# Patient Record
Sex: FEMALE | URBAN - METROPOLITAN AREA
[De-identification: names, ages, dates, MRNs, and addresses within clinical notes are randomized per-mention and may not be internally consistent; named-entity substitution may affect disease eponyms.]

---

## 2020-09-18 ENCOUNTER — OFFICE VISIT (OUTPATIENT)
Dept: URBAN - METROPOLITAN AREA TELEHEALTH 2 | Facility: TELEHEALTH | Age: 50
End: 2020-09-18
Payer: COMMERCIAL

## 2020-09-18 DIAGNOSIS — E66.9 OBESITY (BMI 30.0-34.9): ICD-10-CM

## 2020-09-18 DIAGNOSIS — K21.9 GERD: ICD-10-CM

## 2020-09-18 DIAGNOSIS — D50.8 OTHER IRON DEFICIENCY ANEMIAS: ICD-10-CM

## 2020-09-18 DIAGNOSIS — K50.80 CROHN'S DISEASE OF BOTH SMALL AND LARGE INTESTINE: ICD-10-CM

## 2020-09-18 DIAGNOSIS — K90.89 BILE SALT-INDUCED DIARRHEA: ICD-10-CM

## 2020-09-18 PROCEDURE — 99214 OFFICE O/P EST MOD 30 MIN: CPT | Performed by: INTERNAL MEDICINE

## 2020-09-18 PROCEDURE — 86480 TB TEST CELL IMMUN MEASURE: CPT | Performed by: INTERNAL MEDICINE

## 2020-09-18 PROCEDURE — G9903 PT SCRN TBCO ID AS NON USER: HCPCS | Performed by: INTERNAL MEDICINE

## 2020-09-18 PROCEDURE — G8427 DOCREV CUR MEDS BY ELIG CLIN: HCPCS | Performed by: INTERNAL MEDICINE

## 2020-09-18 PROCEDURE — 1036F TOBACCO NON-USER: CPT | Performed by: INTERNAL MEDICINE

## 2020-09-18 PROCEDURE — 3017F COLORECTAL CA SCREEN DOC REV: CPT | Performed by: INTERNAL MEDICINE

## 2020-09-18 PROCEDURE — G8417 CALC BMI ABV UP PARAM F/U: HCPCS | Performed by: INTERNAL MEDICINE

## 2020-09-18 RX ORDER — AMLODIPINE BESYLATE 10 MG/1
TAKE 1 TABLET (10 MG) BY ORAL ROUTE ONCE DAILY TABLET ORAL 1
Qty: 0 | Refills: 0 | Status: ACTIVE | COMMUNITY
Start: 1900-01-01

## 2020-09-18 RX ORDER — MERCAPTOPURINE 50 MG/1
1 TAB TABLET ORAL DAILY
Qty: 30 TABLET | Refills: 2 | OUTPATIENT
Start: 2020-09-18

## 2020-09-20 ENCOUNTER — TELEPHONE ENCOUNTER (OUTPATIENT)
Dept: URBAN - METROPOLITAN AREA CLINIC 92 | Facility: CLINIC | Age: 50
End: 2020-09-20

## 2020-09-20 PROBLEM — 56689002: Status: ACTIVE | Noted: 2020-09-20

## 2020-09-20 RX ORDER — AMLODIPINE BESYLATE 10 MG/1
TAKE 1 TABLET (10 MG) BY ORAL ROUTE ONCE DAILY TABLET ORAL 1
Qty: 0 | Refills: 0 | Status: ACTIVE | COMMUNITY
Start: 1900-01-01

## 2020-09-20 RX ORDER — DIPHENHYDRAMINE HYDROCHLORIDE 50 MG/ML
0.5 TO 1 ML INJECTION INTRAMUSCULAR; INTRAVENOUS
Qty: 1 VIAL | Refills: 11 | OUTPATIENT
Start: 2020-09-20

## 2020-09-20 RX ORDER — INFLIXIMAB 100 MG/10ML
5 MG/KG INJECTION, POWDER, LYOPHILIZED, FOR SOLUTION INTRAVENOUS
Qty: 5 VIALS | Refills: 11 | OUTPATIENT
Start: 2020-09-20 | End: 2020-10-02

## 2020-09-20 RX ORDER — MERCAPTOPURINE 50 MG/1
1 TAB TABLET ORAL DAILY
Qty: 30 TABLET | Refills: 2 | Status: ACTIVE | COMMUNITY
Start: 2020-09-18

## 2020-09-20 RX ORDER — HYDROCORTISONE SODIUM SUCCINATE 100 MG/2ML
100 TO 200 MG INJECTION, POWDER, FOR SOLUTION INTRAMUSCULAR; INTRAVENOUS
Qty: 2 VIALS | Refills: 11 | OUTPATIENT
Start: 2020-09-20 | End: 2020-10-02

## 2020-09-22 LAB
A/G RATIO: 1.9
ALBUMIN: 4.8
ALKALINE PHOSPHATASE: 102
ALT (SGPT): 25
AST (SGOT): 20
BASO (ABSOLUTE): 0
BASOS: 0
BILIRUBIN, TOTAL: 0.4
BUN/CREATININE RATIO: 6
BUN: 5
C-REACTIVE PROTEIN, QUANT: 2
CALCIUM: 9.9
CARBON DIOXIDE, TOTAL: 22
CHLORIDE: 102
CREATININE: 0.78
EGFR IF AFRICN AM: 103
EGFR IF NONAFRICN AM: 89
EOS (ABSOLUTE): 0.2
EOS: 2
GLOBULIN, TOTAL: 2.5
GLUCOSE: 181
HBSAG SCREEN: NEGATIVE
HEMATOCRIT: 39.9
HEMATOLOGY COMMENTS:: (no result)
HEMOGLOBIN: 12.1
HEP B CORE AB, IGM: NEGATIVE
HEP B CORE AB, TOT: NEGATIVE
HEPATITIS B SURF AB QUANT: <3.1
IMMATURE CELLS: (no result)
IMMATURE GRANS (ABS): 0
IMMATURE GRANULOCYTES: 0
LYMPHS (ABSOLUTE): 1.9
LYMPHS: 28
MCH: 24.1
MCHC: 30.3
MCV: 80
MONOCYTES(ABSOLUTE): 0.2
MONOCYTES: 4
NEUTROPHILS (ABSOLUTE): 4.5
NEUTROPHILS: 66
NRBC: (no result)
PLATELETS: 358
POTASSIUM: 3.5
PROTEIN, TOTAL: 7.3
QUANTIFERON CRITERIA: (no result)
QUANTIFERON INCUBATION: (no result)
QUANTIFERON MITOGEN VALUE: 4.11
QUANTIFERON NIL VALUE: 0.09
QUANTIFERON TB1 AG VALUE: 0.08
QUANTIFERON TB2 AG VALUE: 0.08
QUANTIFERON-TB GOLD PLUS: NEGATIVE
RBC: 5.02
RDW: 15.5
SODIUM: 142
T4,FREE(DIRECT): 1.11
TSH: 2
WBC: 6.8

## 2020-10-20 ENCOUNTER — TELEPHONE ENCOUNTER (OUTPATIENT)
Dept: URBAN - METROPOLITAN AREA CLINIC 17 | Facility: CLINIC | Age: 50
End: 2020-10-20

## 2020-10-30 ENCOUNTER — OFFICE VISIT (OUTPATIENT)
Dept: URBAN - METROPOLITAN AREA TELEHEALTH 2 | Facility: TELEHEALTH | Age: 50
End: 2020-10-30
Payer: COMMERCIAL

## 2020-10-30 DIAGNOSIS — K21.9 GERD: ICD-10-CM

## 2020-10-30 DIAGNOSIS — K90.89 BILE SALT-INDUCED DIARRHEA: ICD-10-CM

## 2020-10-30 DIAGNOSIS — D50.9 IRON DEFICIENCY ANEMIA: ICD-10-CM

## 2020-10-30 DIAGNOSIS — K50.90 CROHN DISEASE: ICD-10-CM

## 2020-10-30 PROCEDURE — 3017F COLORECTAL CA SCREEN DOC REV: CPT | Performed by: INTERNAL MEDICINE

## 2020-10-30 PROCEDURE — G8417 CALC BMI ABV UP PARAM F/U: HCPCS | Performed by: INTERNAL MEDICINE

## 2020-10-30 PROCEDURE — 1036F TOBACCO NON-USER: CPT | Performed by: INTERNAL MEDICINE

## 2020-10-30 PROCEDURE — G8427 DOCREV CUR MEDS BY ELIG CLIN: HCPCS | Performed by: INTERNAL MEDICINE

## 2020-10-30 PROCEDURE — G8483 FLU IMM NO ADMIN DOC REA: HCPCS | Performed by: INTERNAL MEDICINE

## 2020-10-30 PROCEDURE — 99213 OFFICE O/P EST LOW 20 MIN: CPT | Performed by: INTERNAL MEDICINE

## 2020-10-30 PROCEDURE — G9903 PT SCRN TBCO ID AS NON USER: HCPCS | Performed by: INTERNAL MEDICINE

## 2020-10-30 RX ORDER — MERCAPTOPURINE 50 MG/1
1 TAB TABLET ORAL DAILY
Qty: 30 TABLET | Refills: 2 | Status: ACTIVE | COMMUNITY
Start: 2020-09-18

## 2020-10-30 RX ORDER — AMLODIPINE BESYLATE 10 MG/1
TAKE 1 TABLET (10 MG) BY ORAL ROUTE ONCE DAILY TABLET ORAL 1
Qty: 0 | Refills: 0 | Status: ACTIVE | COMMUNITY
Start: 1900-01-01

## 2020-10-30 RX ORDER — DIPHENHYDRAMINE HYDROCHLORIDE 50 MG/ML
0.5 TO 1 ML INJECTION INTRAMUSCULAR; INTRAVENOUS
Qty: 1 VIAL | Refills: 11 | Status: ACTIVE | COMMUNITY
Start: 2020-09-20

## 2020-10-30 NOTE — HPI-OTHER HISTORIES
The patient presents on follow-up for Crohn's disease.  On 11/6/19, she presented in hospital follow-up.  8/1/19 CT enterography noted 4 cm abscess with perforation near anastomosis.  8/2 s/p IR drainage and removed 8/13.  8/8 repeat IR drainage.  8/13 D/C'ed from hospital on TPN, IV Zosyn, prednisone 30 daily, soft diet.  Stopped prednisone after 10 days.  CT 9/6/19 with 2.2 cm abscess.  9/9/19 s/p resection of ileo-colonic anastomotic perforation and wash out of pelvic abscess.    On 11/6/19, she said she was feeling better. Her BMs got back to normal after 5 weeks. She still had a BM 15 min after she ate. It was sometimes watery, while other times it was formed. She had 3-5 BMs/day. She denied hematochezia. She was not taking any medication for Crohn's disease before this last flare up. She said there were no Crohn's medications available when she was diagnosed at the age of 15. She ha only taken prednisone and tapered off. She said she took Imodium for her IBS/post prandial diarrhea. She did not want to go on Remicaid due to her lifestyle making it hard to come in every 2 months for an infusion.   She had a colon in her 40s. She had a GI ([facility]) in [facility] where she lived for 20 years.   On 9/18/20, she said she had 1-2 formed BMs/day. She typically only had 1 BM in the morning unless she ate a richer meal or meat which provided an immediate BM. She denied seeing blood she stated.  Today, she says she's been feeling good, same as last visit. She d/c 6-MP due to experiencing nausea. She hasn't been contacted to schedule a Remicade infusion.  Labs 11/6/19 - TPMT normal. 9/11/19 - CBC normal except hgb 9.7, platelets 505. CMP normal except BUN 4. 9/9/19 - INR normal.  9/3/19 - CBC normal except WBC 3.1. CMP normal except potassium 3.4. 7/29/19 - CMP normal except BUN 6. Mg normal. 7/28/19 - CMP normal except potassium 3.2, bilirubin tot 1.2. Lipase normal.

## 2021-01-22 ENCOUNTER — OFFICE VISIT (OUTPATIENT)
Dept: URBAN - METROPOLITAN AREA CLINIC 91 | Facility: CLINIC | Age: 51
End: 2021-01-22
Payer: COMMERCIAL

## 2021-01-22 VITALS
RESPIRATION RATE: 18 BRPM | TEMPERATURE: 97.8 F | SYSTOLIC BLOOD PRESSURE: 135 MMHG | WEIGHT: 237 LBS | HEART RATE: 81 BPM | DIASTOLIC BLOOD PRESSURE: 87 MMHG | BODY MASS INDEX: 38.09 KG/M2 | HEIGHT: 66 IN

## 2021-01-22 DIAGNOSIS — K50.80 CROHN'S COLITIS: ICD-10-CM

## 2021-01-22 PROCEDURE — 96413 CHEMO IV INFUSION 1 HR: CPT | Performed by: INTERNAL MEDICINE

## 2021-01-22 PROCEDURE — 96415 CHEMO IV INFUSION ADDL HR: CPT | Performed by: INTERNAL MEDICINE

## 2021-01-22 RX ORDER — MERCAPTOPURINE 50 MG/1
1 TAB TABLET ORAL DAILY
Qty: 30 TABLET | Refills: 2 | Status: ACTIVE | COMMUNITY
Start: 2020-09-18

## 2021-01-22 RX ORDER — DIPHENHYDRAMINE HYDROCHLORIDE 50 MG/ML
0.5 TO 1 ML INJECTION INTRAMUSCULAR; INTRAVENOUS
Qty: 1 VIAL | Refills: 11 | Status: ACTIVE | COMMUNITY
Start: 2020-09-20

## 2021-01-22 RX ORDER — AMLODIPINE BESYLATE 10 MG/1
TAKE 1 TABLET (10 MG) BY ORAL ROUTE ONCE DAILY TABLET ORAL 1
Qty: 0 | Refills: 0 | Status: ACTIVE | COMMUNITY
Start: 1900-01-01

## 2021-02-05 ENCOUNTER — OFFICE VISIT (OUTPATIENT)
Dept: URBAN - METROPOLITAN AREA CLINIC 97 | Facility: CLINIC | Age: 51
End: 2021-02-05
Payer: COMMERCIAL

## 2021-02-05 VITALS
TEMPERATURE: 96.6 F | BODY MASS INDEX: 38.09 KG/M2 | WEIGHT: 237 LBS | HEART RATE: 79 BPM | RESPIRATION RATE: 18 BRPM | DIASTOLIC BLOOD PRESSURE: 88 MMHG | HEIGHT: 66 IN | SYSTOLIC BLOOD PRESSURE: 125 MMHG

## 2021-02-05 DIAGNOSIS — K50.80 CROHN'S COLITIS: ICD-10-CM

## 2021-02-05 PROCEDURE — 96413 CHEMO IV INFUSION 1 HR: CPT | Performed by: INTERNAL MEDICINE

## 2021-02-05 PROCEDURE — 96415 CHEMO IV INFUSION ADDL HR: CPT | Performed by: INTERNAL MEDICINE

## 2021-02-05 RX ORDER — AMLODIPINE BESYLATE 10 MG/1
TAKE 1 TABLET (10 MG) BY ORAL ROUTE ONCE DAILY TABLET ORAL 1
Qty: 0 | Refills: 0 | Status: ACTIVE | COMMUNITY
Start: 1900-01-01

## 2021-02-05 RX ORDER — MERCAPTOPURINE 50 MG/1
1 TAB TABLET ORAL DAILY
Qty: 30 TABLET | Refills: 2 | Status: ACTIVE | COMMUNITY
Start: 2020-09-18

## 2021-02-05 RX ORDER — DIPHENHYDRAMINE HYDROCHLORIDE 50 MG/ML
0.5 TO 1 ML INJECTION INTRAMUSCULAR; INTRAVENOUS
Qty: 1 VIAL | Refills: 11 | Status: ACTIVE | COMMUNITY
Start: 2020-09-20

## 2021-03-12 ENCOUNTER — OFFICE VISIT (OUTPATIENT)
Dept: URBAN - METROPOLITAN AREA CLINIC 97 | Facility: CLINIC | Age: 51
End: 2021-03-12

## 2021-05-07 ENCOUNTER — OFFICE VISIT (OUTPATIENT)
Dept: URBAN - METROPOLITAN AREA CLINIC 97 | Facility: CLINIC | Age: 51
End: 2021-05-07
Payer: COMMERCIAL

## 2021-05-07 ENCOUNTER — TELEPHONE ENCOUNTER (OUTPATIENT)
Dept: URBAN - METROPOLITAN AREA CLINIC 18 | Facility: CLINIC | Age: 51
End: 2021-05-07

## 2021-05-07 DIAGNOSIS — K50.80 CROHN'S COLITIS: ICD-10-CM

## 2021-05-07 PROCEDURE — 96413 CHEMO IV INFUSION 1 HR: CPT | Performed by: INTERNAL MEDICINE

## 2021-05-07 PROCEDURE — 96415 CHEMO IV INFUSION ADDL HR: CPT | Performed by: INTERNAL MEDICINE

## 2021-05-07 PROCEDURE — 96375 TX/PRO/DX INJ NEW DRUG ADDON: CPT | Performed by: INTERNAL MEDICINE

## 2021-05-07 RX ORDER — HYDROCORTISONE SODIUM SUCCINATE 100 MG/2ML
100 MG INJECTION, POWDER, FOR SOLUTION INTRAMUSCULAR; INTRAVENOUS
Qty: 100 MG | Refills: 11 | OUTPATIENT
Start: 2020-09-20 | End: 2021-10-25

## 2021-05-07 RX ORDER — INFLIXIMAB 100 MG/10ML
5 MG/KG INJECTION, POWDER, LYOPHILIZED, FOR SOLUTION INTRAVENOUS
Qty: 500 MG | Refills: 11 | OUTPATIENT
Start: 2020-09-20 | End: 2021-10-25

## 2021-05-07 RX ORDER — MERCAPTOPURINE 50 MG/1
1 TAB TABLET ORAL DAILY
Qty: 30 TABLET | Refills: 2 | Status: ACTIVE | COMMUNITY
Start: 2020-09-18

## 2021-05-07 RX ORDER — DIPHENHYDRAMINE HYDROCHLORIDE 50 MG/ML
0.5 TO 1 ML INJECTION INTRAMUSCULAR; INTRAVENOUS
Qty: 1 VIAL | Refills: 11 | Status: ACTIVE | COMMUNITY
Start: 2020-09-20

## 2021-05-07 RX ORDER — AMLODIPINE BESYLATE 10 MG/1
TAKE 1 TABLET (10 MG) BY ORAL ROUTE ONCE DAILY TABLET ORAL 1
Qty: 0 | Refills: 0 | Status: ACTIVE | COMMUNITY
Start: 1900-01-01

## 2021-05-21 ENCOUNTER — OFFICE VISIT (OUTPATIENT)
Dept: URBAN - METROPOLITAN AREA CLINIC 91 | Facility: CLINIC | Age: 51
End: 2021-05-21
Payer: COMMERCIAL

## 2021-05-21 VITALS
SYSTOLIC BLOOD PRESSURE: 135 MMHG | HEART RATE: 71 BPM | RESPIRATION RATE: 18 BRPM | TEMPERATURE: 98.2 F | BODY MASS INDEX: 37.73 KG/M2 | HEIGHT: 66 IN | DIASTOLIC BLOOD PRESSURE: 88 MMHG | WEIGHT: 234.8 LBS

## 2021-05-21 DIAGNOSIS — K50.80 CROHN'S COLITIS: ICD-10-CM

## 2021-05-21 PROCEDURE — 96415 CHEMO IV INFUSION ADDL HR: CPT | Performed by: INTERNAL MEDICINE

## 2021-05-21 PROCEDURE — 96413 CHEMO IV INFUSION 1 HR: CPT | Performed by: INTERNAL MEDICINE

## 2021-05-21 RX ORDER — DIPHENHYDRAMINE HYDROCHLORIDE 50 MG/ML
0.5 TO 1 ML INJECTION INTRAMUSCULAR; INTRAVENOUS
Qty: 1 VIAL | Refills: 11 | Status: ACTIVE | COMMUNITY
Start: 2020-09-20

## 2021-05-21 RX ORDER — HYDROCORTISONE SODIUM SUCCINATE 100 MG/2ML
100 MG INJECTION, POWDER, FOR SOLUTION INTRAMUSCULAR; INTRAVENOUS
Qty: 100 MG | Refills: 11 | Status: ACTIVE | COMMUNITY
Start: 2020-09-20 | End: 2021-10-25

## 2021-05-21 RX ORDER — INFLIXIMAB 100 MG/10ML
5 MG/KG INJECTION, POWDER, LYOPHILIZED, FOR SOLUTION INTRAVENOUS
Qty: 500 MG | Refills: 11 | Status: ACTIVE | COMMUNITY
Start: 2020-09-20 | End: 2021-10-25

## 2021-05-21 RX ORDER — MERCAPTOPURINE 50 MG/1
1 TAB TABLET ORAL DAILY
Qty: 30 TABLET | Refills: 2 | Status: ACTIVE | COMMUNITY
Start: 2020-09-18

## 2021-05-21 RX ORDER — AMLODIPINE BESYLATE 10 MG/1
TAKE 1 TABLET (10 MG) BY ORAL ROUTE ONCE DAILY TABLET ORAL 1
Qty: 0 | Refills: 0 | Status: ACTIVE | COMMUNITY
Start: 1900-01-01

## 2021-06-18 ENCOUNTER — OFFICE VISIT (OUTPATIENT)
Dept: URBAN - METROPOLITAN AREA CLINIC 91 | Facility: CLINIC | Age: 51
End: 2021-06-18
Payer: COMMERCIAL

## 2021-06-18 VITALS
DIASTOLIC BLOOD PRESSURE: 84 MMHG | BODY MASS INDEX: 38.25 KG/M2 | HEIGHT: 66 IN | HEART RATE: 80 BPM | RESPIRATION RATE: 18 BRPM | TEMPERATURE: 98.4 F | SYSTOLIC BLOOD PRESSURE: 154 MMHG | WEIGHT: 238 LBS

## 2021-06-18 DIAGNOSIS — K50.80 CROHN'S COLITIS: ICD-10-CM

## 2021-06-18 PROCEDURE — 96415 CHEMO IV INFUSION ADDL HR: CPT | Performed by: INTERNAL MEDICINE

## 2021-06-18 PROCEDURE — 96413 CHEMO IV INFUSION 1 HR: CPT | Performed by: INTERNAL MEDICINE

## 2021-06-18 RX ORDER — AMLODIPINE BESYLATE 10 MG/1
TAKE 1 TABLET (10 MG) BY ORAL ROUTE ONCE DAILY TABLET ORAL 1
Qty: 0 | Refills: 0 | Status: ACTIVE | COMMUNITY
Start: 1900-01-01

## 2021-06-18 RX ORDER — INFLIXIMAB 100 MG/10ML
5 MG/KG INJECTION, POWDER, LYOPHILIZED, FOR SOLUTION INTRAVENOUS
Qty: 500 MG | Refills: 11 | Status: ACTIVE | COMMUNITY
Start: 2020-09-20 | End: 2021-10-25

## 2021-06-18 RX ORDER — HYDROCORTISONE SODIUM SUCCINATE 100 MG/2ML
100 MG INJECTION, POWDER, FOR SOLUTION INTRAMUSCULAR; INTRAVENOUS
Qty: 100 MG | Refills: 11 | Status: ACTIVE | COMMUNITY
Start: 2020-09-20 | End: 2021-10-25

## 2021-06-18 RX ORDER — DIPHENHYDRAMINE HYDROCHLORIDE 50 MG/ML
0.5 TO 1 ML INJECTION INTRAMUSCULAR; INTRAVENOUS
Qty: 1 VIAL | Refills: 11 | Status: ACTIVE | COMMUNITY
Start: 2020-09-20

## 2021-06-18 RX ORDER — MERCAPTOPURINE 50 MG/1
1 TAB TABLET ORAL DAILY
Qty: 30 TABLET | Refills: 2 | Status: ACTIVE | COMMUNITY
Start: 2020-09-18

## 2021-06-30 ENCOUNTER — OFFICE VISIT (OUTPATIENT)
Dept: URBAN - METROPOLITAN AREA CLINIC 97 | Facility: CLINIC | Age: 51
End: 2021-06-30

## 2021-07-08 ENCOUNTER — DASHBOARD ENCOUNTERS (OUTPATIENT)
Age: 51
End: 2021-07-08

## 2021-07-09 ENCOUNTER — OFFICE VISIT (OUTPATIENT)
Dept: URBAN - METROPOLITAN AREA TELEHEALTH 2 | Facility: TELEHEALTH | Age: 51
End: 2021-07-09
Payer: COMMERCIAL

## 2021-07-09 ENCOUNTER — LAB OUTSIDE AN ENCOUNTER (OUTPATIENT)
Dept: URBAN - METROPOLITAN AREA TELEHEALTH 2 | Facility: TELEHEALTH | Age: 51
End: 2021-07-09

## 2021-07-09 DIAGNOSIS — K21.9 GERD: ICD-10-CM

## 2021-07-09 DIAGNOSIS — K50.018 CROHN'S DISEASE OF SMALL INTESTINE WITH OTHER COMPLICATION: ICD-10-CM

## 2021-07-09 DIAGNOSIS — K90.89 BILE SALT-INDUCED DIARRHEA: ICD-10-CM

## 2021-07-09 DIAGNOSIS — D50.9 IRON DEFICIENCY ANEMIA: ICD-10-CM

## 2021-07-09 PROCEDURE — 99214 OFFICE O/P EST MOD 30 MIN: CPT | Performed by: INTERNAL MEDICINE

## 2021-07-09 RX ORDER — AMLODIPINE BESYLATE 10 MG/1
TAKE 1 TABLET (10 MG) BY ORAL ROUTE ONCE DAILY TABLET ORAL 1
Qty: 0 | Refills: 0 | Status: ACTIVE | COMMUNITY
Start: 1900-01-01

## 2021-07-09 RX ORDER — HYDROCORTISONE SODIUM SUCCINATE 100 MG/2ML
100 MG INJECTION, POWDER, FOR SOLUTION INTRAMUSCULAR; INTRAVENOUS
Qty: 100 MG | Refills: 11 | Status: ACTIVE | COMMUNITY
Start: 2020-09-20 | End: 2021-10-25

## 2021-07-09 RX ORDER — INFLIXIMAB 100 MG/10ML
5 MG/KG INJECTION, POWDER, LYOPHILIZED, FOR SOLUTION INTRAVENOUS
Qty: 500 MG | Refills: 11 | Status: ACTIVE | COMMUNITY
Start: 2020-09-20 | End: 2021-10-25

## 2021-07-09 RX ORDER — MERCAPTOPURINE 50 MG/1
1 TAB TABLET ORAL DAILY
Qty: 30 TABLET | Refills: 2 | Status: ACTIVE | COMMUNITY
Start: 2020-09-18

## 2021-07-09 RX ORDER — DIPHENHYDRAMINE HYDROCHLORIDE 50 MG/ML
0.5 TO 1 ML INJECTION INTRAMUSCULAR; INTRAVENOUS
Qty: 1 VIAL | Refills: 11 | Status: ACTIVE | COMMUNITY
Start: 2020-09-20

## 2021-07-09 NOTE — HPI-OTHER HISTORIES
The patient presents on follow-up for Crohn's disease.  On 11/6/19, she presented in hospital follow-up.  8/1/19 CT enterography noted 4 cm abscess with perforation near anastomosis.  8/2 s/p IR drainage and removed 8/13.  8/8 repeat IR drainage.  8/13 D/C'ed from hospital on TPN, IV Zosyn, prednisone 30 daily, soft diet.  Stopped prednisone after 10 days.  CT 9/6/19 with 2.2 cm abscess.  9/9/19 s/p resection of ileo-colonic anastomotic perforation and wash out of pelvic abscess.    On 11/6/19, she said she was feeling better. Her BMs got back to normal after 5 weeks. She still had a BM 15 min after she ate. It was sometimes watery, while other times it was formed. She had 3-5 BMs/day. She denied hematochezia. She was not taking any medication for Crohn's disease before this last flare up. She said there were no Crohn's medications available when she was diagnosed at the age of 15. She had only taken prednisone and tapered off. She said she took Imodium for her IBS/post prandial diarrhea. She did not want to go on Remicaid due to her lifestyle making it hard to come in every 2 months for an infusion.   She had a colon in her 40s. She had a GI ([facility]) in [facility] where she lived for 20 years.   On 9/18/20, she said she had 1-2 formed BMs/day. She typically only had 1 BM in the morning unless she ate a richer meal or meat which provided an immediate BM. She denied seeing blood she stated.  On 10/30/20, she said she had been feeling good, same as last visit. She d/c 6-MP due to experiencing nausea. She hadn't been contacted to schedule a Remicade infusion.  Today, she says she had her Remicade induction infusion in January and has had 5 infusions in total. She is now on an 8-week regimen and has her next infusion on August 13. She says she missed her March infusion because she forgot. She no longer has diarrhea or urgency, but notes a mild constipation or the feeling of being "stuffed" (bloated). She has a BM at least BID with good evacuation. She has not had rectal bleeding or abdominal pain. She is not taking iron due to a side-effect of constipation. She takes Imodium before eating creamy or rich foods (does not remember how often she takes it). She denies reflux.  Labs 11/6/19 - TPMT normal. 9/11/19 - CBC normal except hgb 9.7, platelets 505. CMP normal except BUN 4. 9/9/19 - INR normal.  9/3/19 - CBC normal except WBC 3.1. CMP normal except potassium 3.4. 7/29/19 - CMP normal except BUN 6. Mg normal. 7/28/19 - CMP normal except potassium 3.2, bilirubin tot 1.2. Lipase normal.

## 2021-07-10 PROBLEM — 87522002 IRON DEFICIENCY ANEMIA: Status: ACTIVE | Noted: 2021-07-08

## 2021-07-10 PROBLEM — 414916001 OBESITY: Status: ACTIVE | Noted: 2021-07-08

## 2021-07-10 PROBLEM — 235595009 GASTROESOPHAGEAL REFLUX DISEASE: Status: ACTIVE | Noted: 2021-07-08

## 2021-07-10 PROBLEM — 56689002: Status: ACTIVE | Noted: 2021-05-10

## 2021-07-10 PROBLEM — 197476001 INTESTINAL MALABSORPTION: Status: ACTIVE | Noted: 2021-07-08

## 2021-08-13 ENCOUNTER — TELEPHONE ENCOUNTER (OUTPATIENT)
Dept: URBAN - METROPOLITAN AREA CLINIC 91 | Facility: CLINIC | Age: 51
End: 2021-08-13

## 2021-08-13 ENCOUNTER — OFFICE VISIT (OUTPATIENT)
Dept: URBAN - METROPOLITAN AREA CLINIC 91 | Facility: CLINIC | Age: 51
End: 2021-08-13
Payer: COMMERCIAL

## 2021-08-13 VITALS
SYSTOLIC BLOOD PRESSURE: 144 MMHG | TEMPERATURE: 98.4 F | HEART RATE: 70 BPM | RESPIRATION RATE: 17 BRPM | HEIGHT: 66 IN | WEIGHT: 243 LBS | DIASTOLIC BLOOD PRESSURE: 85 MMHG | BODY MASS INDEX: 39.05 KG/M2

## 2021-08-13 DIAGNOSIS — K50.80 CROHN'S COLITIS: ICD-10-CM

## 2021-08-13 PROBLEM — 56689002: Status: ACTIVE | Noted: 2021-08-13

## 2021-08-13 PROCEDURE — 96413 CHEMO IV INFUSION 1 HR: CPT | Performed by: INTERNAL MEDICINE

## 2021-08-13 PROCEDURE — 96415 CHEMO IV INFUSION ADDL HR: CPT | Performed by: INTERNAL MEDICINE

## 2021-08-13 RX ORDER — AMLODIPINE BESYLATE 10 MG/1
TAKE 1 TABLET (10 MG) BY ORAL ROUTE ONCE DAILY TABLET ORAL 1
Qty: 0 | Refills: 0 | Status: ACTIVE | COMMUNITY
Start: 1900-01-01

## 2021-08-13 RX ORDER — INFLIXIMAB 100 MG/10ML
5 MG/KG INJECTION, POWDER, LYOPHILIZED, FOR SOLUTION INTRAVENOUS
Qty: 500 MG | Refills: 11 | Status: ACTIVE | COMMUNITY
Start: 2020-09-20 | End: 2021-10-25

## 2021-08-13 RX ORDER — MERCAPTOPURINE 50 MG/1
1 TAB TABLET ORAL DAILY
Qty: 30 TABLET | Refills: 2 | Status: ACTIVE | COMMUNITY
Start: 2020-09-18

## 2021-08-13 RX ORDER — DIPHENHYDRAMINE HYDROCHLORIDE 50 MG/ML
0.5 TO 1 ML INJECTION INTRAMUSCULAR; INTRAVENOUS
Qty: 1 VIAL | Refills: 11 | Status: ACTIVE | COMMUNITY
Start: 2020-09-20

## 2021-08-13 RX ORDER — HYDROCORTISONE SODIUM SUCCINATE 100 MG/2ML
100 MG INJECTION, POWDER, FOR SOLUTION INTRAMUSCULAR; INTRAVENOUS
Qty: 100 MG | Refills: 11 | Status: ACTIVE | COMMUNITY
Start: 2020-09-20 | End: 2021-10-25

## 2021-08-13 RX ORDER — INFLIXIMAB 100 MG/10ML
5 MG/KG INJECTION, POWDER, LYOPHILIZED, FOR SOLUTION INTRAVENOUS
Qty: 500 MG | Refills: 5 | OUTPATIENT
Start: 2020-09-20 | End: 2022-07-15

## 2021-08-14 LAB
A/G RATIO: 1.6
ALBUMIN: 4.2
ALKALINE PHOSPHATASE: 97
ALT (SGPT): 42
AST (SGOT): 28
BASO (ABSOLUTE): 0
BASOS: 1
BILIRUBIN, TOTAL: 0.2
BUN/CREATININE RATIO: 11
BUN: 6
CALCIUM: 9.2
CARBON DIOXIDE, TOTAL: 25
CHLORIDE: 104
CREATININE: 0.54
EGFR IF AFRICN AM: 126
EGFR IF NONAFRICN AM: 110
EOS (ABSOLUTE): 0.2
EOS: 3
FERRITIN, SERUM: 25
GLOBULIN, TOTAL: 2.6
GLUCOSE: 112
HEMATOCRIT: 39.3
HEMATOLOGY COMMENTS:: (no result)
HEMOGLOBIN: 12.3
IMMATURE CELLS: (no result)
IMMATURE GRANS (ABS): 0
IMMATURE GRANULOCYTES: 0
IRON BIND.CAP.(TIBC): 460
IRON SATURATION: 8
IRON: 38
LYMPHS (ABSOLUTE): 3.1
LYMPHS: 44
MCH: 26.2
MCHC: 31.3
MCV: 84
MONOCYTES(ABSOLUTE): 0.6
MONOCYTES: 9
NEUTROPHILS (ABSOLUTE): 3
NEUTROPHILS: 43
NRBC: (no result)
PLATELETS: 240
POTASSIUM: 3.8
PROTEIN, TOTAL: 6.8
RBC: 4.69
RDW: 14.7
SODIUM: 143
UIBC: 422
WBC: 6.9

## 2021-08-27 ENCOUNTER — OFFICE VISIT (OUTPATIENT)
Dept: URBAN - METROPOLITAN AREA CLINIC 97 | Facility: CLINIC | Age: 51
End: 2021-08-27

## 2021-10-08 ENCOUNTER — OFFICE VISIT (OUTPATIENT)
Dept: URBAN - METROPOLITAN AREA CLINIC 91 | Facility: CLINIC | Age: 51
End: 2021-10-08
Payer: COMMERCIAL

## 2021-10-08 ENCOUNTER — TELEPHONE ENCOUNTER (OUTPATIENT)
Dept: URBAN - METROPOLITAN AREA CLINIC 18 | Facility: CLINIC | Age: 51
End: 2021-10-08

## 2021-10-08 VITALS
WEIGHT: 247 LBS | SYSTOLIC BLOOD PRESSURE: 148 MMHG | HEART RATE: 76 BPM | HEIGHT: 66 IN | RESPIRATION RATE: 20 BRPM | DIASTOLIC BLOOD PRESSURE: 98 MMHG | TEMPERATURE: 98.3 F | BODY MASS INDEX: 39.7 KG/M2

## 2021-10-08 DIAGNOSIS — K50.80 CROHN'S COLITIS: ICD-10-CM

## 2021-10-08 PROCEDURE — 96413 CHEMO IV INFUSION 1 HR: CPT | Performed by: INTERNAL MEDICINE

## 2021-10-08 RX ORDER — AMLODIPINE BESYLATE 10 MG/1
TAKE 1 TABLET (10 MG) BY ORAL ROUTE ONCE DAILY TABLET ORAL 1
Qty: 0 | Refills: 0 | Status: ACTIVE | COMMUNITY
Start: 1900-01-01

## 2021-10-08 RX ORDER — DIPHENHYDRAMINE HYDROCHLORIDE 50 MG/ML
0.5 TO 1 ML INJECTION INTRAMUSCULAR; INTRAVENOUS
Qty: 1 VIAL | Refills: 11 | Status: ACTIVE | COMMUNITY
Start: 2020-09-20

## 2021-10-08 RX ORDER — INFLIXIMAB 100 MG/10ML
5 MG/KG INJECTION, POWDER, LYOPHILIZED, FOR SOLUTION INTRAVENOUS
Qty: 500 MG | Refills: 5 | Status: ACTIVE | COMMUNITY
Start: 2020-09-20 | End: 2022-07-15

## 2021-10-08 RX ORDER — MERCAPTOPURINE 50 MG/1
1 TAB TABLET ORAL DAILY
Qty: 30 TABLET | Refills: 2 | Status: ACTIVE | COMMUNITY
Start: 2020-09-18

## 2021-10-08 RX ORDER — HYDROCORTISONE SODIUM SUCCINATE 100 MG/2ML
100 MG INJECTION, POWDER, FOR SOLUTION INTRAMUSCULAR; INTRAVENOUS
Qty: 100 MG | Refills: 11 | Status: ACTIVE | COMMUNITY
Start: 2020-09-20 | End: 2021-10-25

## 2021-12-03 ENCOUNTER — OFFICE VISIT (OUTPATIENT)
Dept: URBAN - METROPOLITAN AREA CLINIC 91 | Facility: CLINIC | Age: 51
End: 2021-12-03
Payer: COMMERCIAL

## 2021-12-03 VITALS
HEART RATE: 79 BPM | RESPIRATION RATE: 20 BRPM | WEIGHT: 249 LBS | BODY MASS INDEX: 40.02 KG/M2 | SYSTOLIC BLOOD PRESSURE: 149 MMHG | TEMPERATURE: 97.5 F | HEIGHT: 66 IN | DIASTOLIC BLOOD PRESSURE: 91 MMHG

## 2021-12-03 DIAGNOSIS — K50.80 CROHN'S COLITIS: ICD-10-CM

## 2021-12-03 PROCEDURE — 96413 CHEMO IV INFUSION 1 HR: CPT | Performed by: INTERNAL MEDICINE

## 2021-12-03 RX ORDER — MERCAPTOPURINE 50 MG/1
1 TAB TABLET ORAL DAILY
Qty: 30 TABLET | Refills: 2 | Status: ACTIVE | COMMUNITY
Start: 2020-09-18

## 2021-12-03 RX ORDER — DIPHENHYDRAMINE HYDROCHLORIDE 50 MG/ML
0.5 TO 1 ML INJECTION INTRAMUSCULAR; INTRAVENOUS
Qty: 1 VIAL | Refills: 11 | Status: ACTIVE | COMMUNITY
Start: 2020-09-20

## 2021-12-03 RX ORDER — AMLODIPINE BESYLATE 10 MG/1
TAKE 1 TABLET (10 MG) BY ORAL ROUTE ONCE DAILY TABLET ORAL 1
Qty: 0 | Refills: 0 | Status: ACTIVE | COMMUNITY
Start: 1900-01-01

## 2021-12-03 RX ORDER — INFLIXIMAB 100 MG/10ML
5 MG/KG INJECTION, POWDER, LYOPHILIZED, FOR SOLUTION INTRAVENOUS
Qty: 500 MG | Refills: 5 | Status: ACTIVE | COMMUNITY
Start: 2020-09-20 | End: 2022-07-15

## 2021-12-10 PROBLEM — 34000006 CROHN DISEASE: Status: ACTIVE | Noted: 2021-03-03

## 2022-01-28 ENCOUNTER — OFFICE VISIT (OUTPATIENT)
Dept: URBAN - METROPOLITAN AREA CLINIC 91 | Facility: CLINIC | Age: 52
End: 2022-01-28

## 2022-01-28 RX ORDER — AMLODIPINE BESYLATE 10 MG/1
TAKE 1 TABLET (10 MG) BY ORAL ROUTE ONCE DAILY TABLET ORAL 1
Qty: 0 | Refills: 0 | Status: ACTIVE | COMMUNITY
Start: 1900-01-01

## 2022-01-28 RX ORDER — DIPHENHYDRAMINE HYDROCHLORIDE 50 MG/ML
0.5 TO 1 ML INJECTION INTRAMUSCULAR; INTRAVENOUS
Qty: 1 VIAL | Refills: 11 | Status: ACTIVE | COMMUNITY
Start: 2020-09-20

## 2022-01-28 RX ORDER — INFLIXIMAB 100 MG/10ML
5 MG/KG INJECTION, POWDER, LYOPHILIZED, FOR SOLUTION INTRAVENOUS
Qty: 500 MG | Refills: 5 | Status: ACTIVE | COMMUNITY
Start: 2020-09-20 | End: 2022-07-15

## 2022-01-28 RX ORDER — MERCAPTOPURINE 50 MG/1
1 TAB TABLET ORAL DAILY
Qty: 30 TABLET | Refills: 2 | Status: ACTIVE | COMMUNITY
Start: 2020-09-18

## 2022-02-04 ENCOUNTER — OFFICE VISIT (OUTPATIENT)
Dept: URBAN - METROPOLITAN AREA CLINIC 91 | Facility: CLINIC | Age: 52
End: 2022-02-04
Payer: COMMERCIAL

## 2022-02-04 VITALS
BODY MASS INDEX: 40.02 KG/M2 | TEMPERATURE: 97.9 F | HEIGHT: 66 IN | HEART RATE: 80 BPM | RESPIRATION RATE: 20 BRPM | WEIGHT: 249 LBS | SYSTOLIC BLOOD PRESSURE: 152 MMHG | DIASTOLIC BLOOD PRESSURE: 92 MMHG

## 2022-02-04 DIAGNOSIS — K50.80 CROHN'S COLITIS: ICD-10-CM

## 2022-02-04 PROCEDURE — 96413 CHEMO IV INFUSION 1 HR: CPT | Performed by: INTERNAL MEDICINE

## 2022-02-04 RX ORDER — MERCAPTOPURINE 50 MG/1
1 TAB TABLET ORAL DAILY
Qty: 30 TABLET | Refills: 2 | Status: ACTIVE | COMMUNITY
Start: 2020-09-18

## 2022-02-04 RX ORDER — INFLIXIMAB 100 MG/10ML
5 MG/KG INJECTION, POWDER, LYOPHILIZED, FOR SOLUTION INTRAVENOUS
Qty: 500 MG | Refills: 5 | Status: ACTIVE | COMMUNITY
Start: 2020-09-20 | End: 2022-07-15

## 2022-02-04 RX ORDER — DIPHENHYDRAMINE HYDROCHLORIDE 50 MG/ML
0.5 TO 1 ML INJECTION INTRAMUSCULAR; INTRAVENOUS
Qty: 1 VIAL | Refills: 11 | Status: ACTIVE | COMMUNITY
Start: 2020-09-20

## 2022-02-04 RX ORDER — AMLODIPINE BESYLATE 10 MG/1
TAKE 1 TABLET (10 MG) BY ORAL ROUTE ONCE DAILY TABLET ORAL 1
Qty: 0 | Refills: 0 | Status: ACTIVE | COMMUNITY
Start: 1900-01-01

## 2022-03-25 ENCOUNTER — OFFICE VISIT (OUTPATIENT)
Dept: URBAN - METROPOLITAN AREA CLINIC 91 | Facility: CLINIC | Age: 52
End: 2022-03-25

## 2022-04-08 ENCOUNTER — OFFICE VISIT (OUTPATIENT)
Dept: URBAN - METROPOLITAN AREA CLINIC 91 | Facility: CLINIC | Age: 52
End: 2022-04-08
Payer: COMMERCIAL

## 2022-04-08 ENCOUNTER — TELEPHONE ENCOUNTER (OUTPATIENT)
Dept: URBAN - METROPOLITAN AREA CLINIC 91 | Facility: CLINIC | Age: 52
End: 2022-04-08

## 2022-04-08 VITALS
WEIGHT: 249 LBS | RESPIRATION RATE: 18 BRPM | HEART RATE: 93 BPM | TEMPERATURE: 97.3 F | HEIGHT: 66 IN | BODY MASS INDEX: 40.02 KG/M2 | DIASTOLIC BLOOD PRESSURE: 97 MMHG | SYSTOLIC BLOOD PRESSURE: 142 MMHG

## 2022-04-08 DIAGNOSIS — K50.80 CROHN'S COLITIS: ICD-10-CM

## 2022-04-08 PROCEDURE — 96413 CHEMO IV INFUSION 1 HR: CPT | Performed by: INTERNAL MEDICINE

## 2022-04-08 RX ORDER — DIPHENHYDRAMINE HYDROCHLORIDE 50 MG/ML
0.5 TO 1 ML INJECTION INTRAMUSCULAR; INTRAVENOUS
Qty: 1 VIAL | Refills: 11 | Status: ACTIVE | COMMUNITY
Start: 2020-09-20

## 2022-04-08 RX ORDER — AMLODIPINE BESYLATE 10 MG/1
TAKE 1 TABLET (10 MG) BY ORAL ROUTE ONCE DAILY TABLET ORAL 1
Qty: 0 | Refills: 0 | Status: ACTIVE | COMMUNITY
Start: 1900-01-01

## 2022-04-08 RX ORDER — INFLIXIMAB 100 MG/10ML
5 MG/KG INJECTION, POWDER, LYOPHILIZED, FOR SOLUTION INTRAVENOUS
Qty: 500 MG | Refills: 5 | Status: ACTIVE | COMMUNITY
Start: 2020-09-20 | End: 2022-07-15

## 2022-04-08 RX ORDER — MERCAPTOPURINE 50 MG/1
1 TAB TABLET ORAL DAILY
Qty: 30 TABLET | Refills: 2 | Status: ACTIVE | COMMUNITY
Start: 2020-09-18

## 2022-04-15 NOTE — HPI-OTHER HISTORIES
The patient presents on follow-up for Crohn's disease.  On 11/6/19, she presented in hospital follow-up.  8/1/19 CT enterography noted 4 cm abscess with perforation near anastomosis.  8/2 s/p IR drainage and removed 8/13.  8/8 repeat IR drainage.  8/13 D/C'ed from hospital on TPN, IV Zosyn, prednisone 30 daily, soft diet.  Stopped prednisone after 10 days.  CT 9/6/19 with 2.2 cm abscess.  9/9/19 s/p resection of ileo-colonic anastomotic perforation and wash out of pelvic abscess.    On 11/6/19, she said she was feeling better. Her BMs got back to normal after 5 weeks. She still had a BM 15 min after she ate. It was sometimes watery, while other times it was formed. She had 3-5 BMs/day. She denied hematochezia. She was not taking any medication for Crohn's disease before this last flare up. She said there were no Crohn's medications available when she was diagnosed at the age of 15. She ha only taken prednisone and tapered off. She said she took Imodium for her IBS/post prandial diarrhea. She did not want to go on Remicaid due to her lifestyle making it hard to come in every 2 months for an infusion.   She had a colon in her 40s. She had a GI ([facility]) in [facility] where she lived for 20 years.   Today, she says she has 1-2 formed BMs/day. She typically only has 1 BM in the morning unless she eats a richer meal or meat which provides an immediate BM. She denies seeing blood she states.  Labs 11/6/19 - TPMT normal. 9/11/19 - CBC normal except hgb 9.7, platelets 505. CMP normal except BUN 4. 9/9/19 - INR normal.  9/3/19 - CBC normal except WBC 3.1. CMP normal except potassium 3.4. 7/29/19 - CMP normal except BUN 6. Mg normal. 7/28/19 - CMP normal except potassium 3.2, bilirubin tot 1.2. Lipase normal.
no history of blood product transfusion

## 2022-05-19 ENCOUNTER — RASH (OUTPATIENT)
Dept: URBAN - METROPOLITAN AREA CLINIC 36 | Facility: CLINIC | Age: 52
Setting detail: DERMATOLOGY
End: 2022-05-19

## 2022-05-19 ENCOUNTER — RX ONLY (RX ONLY)
Age: 52
End: 2022-05-19

## 2022-05-19 DIAGNOSIS — Z79.899 OTHER LONG-TERM (CURRENT) DRUG THERAPY: ICD-10-CM

## 2022-05-19 PROCEDURE — 99204 OFFICE O/P NEW MOD 45 MIN: CPT

## 2022-05-19 RX ORDER — BETAMETHASONE DIPROPIONATE 0.5 MG/G
CREAM TOPICAL
Qty: 45 | Refills: 0
Start: 2022-05-19

## 2022-05-19 RX ORDER — TRIFAROTENE 50 UG/G
1 A SMALL AMOUNT CREAM TOPICAL EVERY NIGHT
Qty: 45 | Refills: 0
Start: 2022-05-19

## 2022-05-23 ENCOUNTER — TELEPHONE ENCOUNTER (OUTPATIENT)
Dept: URBAN - METROPOLITAN AREA CLINIC 91 | Facility: CLINIC | Age: 52
End: 2022-05-23

## 2022-05-27 ENCOUNTER — OFFICE VISIT (OUTPATIENT)
Dept: URBAN - METROPOLITAN AREA CLINIC 91 | Facility: CLINIC | Age: 52
End: 2022-05-27
Payer: COMMERCIAL

## 2022-05-27 VITALS
RESPIRATION RATE: 18 BRPM | TEMPERATURE: 97 F | HEIGHT: 66 IN | HEART RATE: 78 BPM | SYSTOLIC BLOOD PRESSURE: 158 MMHG | DIASTOLIC BLOOD PRESSURE: 100 MMHG | WEIGHT: 249 LBS | BODY MASS INDEX: 40.02 KG/M2

## 2022-05-27 DIAGNOSIS — K50.80 CROHN'S COLITIS: ICD-10-CM

## 2022-05-27 PROCEDURE — 96413 CHEMO IV INFUSION 1 HR: CPT | Performed by: INTERNAL MEDICINE

## 2022-05-27 RX ORDER — DIPHENHYDRAMINE HYDROCHLORIDE 50 MG/ML
0.5 TO 1 ML INJECTION INTRAMUSCULAR; INTRAVENOUS
Qty: 1 VIAL | Refills: 11 | Status: ACTIVE | COMMUNITY
Start: 2020-09-20

## 2022-05-27 RX ORDER — INFLIXIMAB 100 MG/10ML
5 MG/KG INJECTION, POWDER, LYOPHILIZED, FOR SOLUTION INTRAVENOUS
Qty: 500 MG | Refills: 5 | Status: ACTIVE | COMMUNITY
Start: 2020-09-20 | End: 2022-07-15

## 2022-05-27 RX ORDER — MERCAPTOPURINE 50 MG/1
1 TAB TABLET ORAL DAILY
Qty: 30 TABLET | Refills: 2 | Status: ACTIVE | COMMUNITY
Start: 2020-09-18

## 2022-05-27 RX ORDER — AMLODIPINE BESYLATE 10 MG/1
TAKE 1 TABLET (10 MG) BY ORAL ROUTE ONCE DAILY TABLET ORAL 1
Qty: 0 | Refills: 0 | Status: ACTIVE | COMMUNITY
Start: 1900-01-01

## 2022-12-14 PROBLEM — 71833008 CROHN'S DISEASE OF SMALL AND LARGE INTESTINES: Status: ACTIVE | Noted: 2022-12-14
